# Patient Record
Sex: FEMALE | Race: WHITE
[De-identification: names, ages, dates, MRNs, and addresses within clinical notes are randomized per-mention and may not be internally consistent; named-entity substitution may affect disease eponyms.]

---

## 2019-06-08 ENCOUNTER — HOSPITAL ENCOUNTER (EMERGENCY)
Dept: HOSPITAL 26 - MED | Age: 49
Discharge: HOME | End: 2019-06-08
Payer: MEDICAID

## 2019-06-08 VITALS — HEIGHT: 64 IN | BODY MASS INDEX: 20.49 KG/M2 | WEIGHT: 120 LBS

## 2019-06-08 VITALS — DIASTOLIC BLOOD PRESSURE: 47 MMHG | SYSTOLIC BLOOD PRESSURE: 113 MMHG

## 2019-06-08 VITALS — DIASTOLIC BLOOD PRESSURE: 80 MMHG | SYSTOLIC BLOOD PRESSURE: 130 MMHG

## 2019-06-08 DIAGNOSIS — R51: ICD-10-CM

## 2019-06-08 DIAGNOSIS — N39.0: Primary | ICD-10-CM

## 2019-06-08 PROCEDURE — 96375 TX/PRO/DX INJ NEW DRUG ADDON: CPT

## 2019-06-08 PROCEDURE — 99283 EMERGENCY DEPT VISIT LOW MDM: CPT

## 2019-06-08 PROCEDURE — 81025 URINE PREGNANCY TEST: CPT

## 2019-06-08 PROCEDURE — 96374 THER/PROPH/DIAG INJ IV PUSH: CPT

## 2019-06-08 PROCEDURE — 81002 URINALYSIS NONAUTO W/O SCOPE: CPT

## 2019-06-08 PROCEDURE — 96361 HYDRATE IV INFUSION ADD-ON: CPT

## 2019-06-08 NOTE — NUR
Patient discharged with v/s stable. Written and verbal after care instructions 
given and explained. 

Patient alert, oriented and verbalized understanding of instructions. 
Ambulatory with steady gait. All questions addressed prior to discharge. ID 
band removed. Patient advised to follow up with PMD. Rx of CIPRO, ZOFRAN, 
MOTRIN given. Patient educated on indication of medication including possible 
reaction and side effects. Opportunity to ask questions provided and answered.

## 2019-06-08 NOTE — NUR
BIB . AAO X4 C/O VOMITTING X3 45 MINUTES AGO, NUMBNESS TO BOTH HANDS. 
HEADACHE 5/10. PT DENIES SOB, FEVER. EQUAL RAMANDEEP STRENGTH TO UPPER EXTREMITIES, 
STEADY GAIT. PT PLACEDON FULL CARDIAC MONITOR. HOB UP. BED SIDE RAILS UP X1. ON 
LOW BED POSITION, LOCKED. MD ER TO JOYA PT.

## 2020-10-30 ENCOUNTER — HOSPITAL ENCOUNTER (EMERGENCY)
Dept: HOSPITAL 26 - MED | Age: 50
Discharge: HOME | End: 2020-10-30
Payer: MEDICAID

## 2020-10-30 VITALS — BODY MASS INDEX: 24.35 KG/M2 | HEIGHT: 60 IN | WEIGHT: 124 LBS

## 2020-10-30 VITALS — SYSTOLIC BLOOD PRESSURE: 107 MMHG | DIASTOLIC BLOOD PRESSURE: 70 MMHG

## 2020-10-30 DIAGNOSIS — R51.9: Primary | ICD-10-CM

## 2020-10-30 PROCEDURE — 99283 EMERGENCY DEPT VISIT LOW MDM: CPT

## 2020-10-30 PROCEDURE — 96372 THER/PROPH/DIAG INJ SC/IM: CPT

## 2020-10-30 NOTE — NUR
Patient discharged with v/s stable. Written and verbal after care instructions 
given and explained. 

Patient alert, oriented and verbalized understanding of instructions. 
Ambulatory with steady gait. All questions addressed prior to discharge. ID 
band removed. Patient advised to follow up with PMD. Rx of Naproxen 375mg and 
Loratadine 10mg given. Patient educated on indication of medication including 
possible reaction and side effects. Opportunity to ask questions provided and 
answered.

## 2020-10-30 NOTE — NUR
49 y/o female c/o headache x 5 months that radiates to neck. Pt states 
intermittent blurred vision. States 5/10 aching pain to head. Last took 
medication for pain 2 days ago. Denies N/V/D. Awake and alert. VSS 



medhx: denies